# Patient Record
(demographics unavailable — no encounter records)

---

## 2024-10-07 NOTE — HISTORY OF PRESENT ILLNESS
[FreeTextEntry1] :  54 year old M with PMHx of _____ presents to establish cardiac care.   -hospital FU, high BP, chest pain  Referred for: PCP: Diet: Activity:   Pt. denies any chest pain, dyspnea, orthopnea, PND, palpitations, lightheadedness, syncope or lower extremity edema.   ================================ Previous workup: Labs (   Echo (   Stress test (